# Patient Record
Sex: FEMALE | Race: WHITE
[De-identification: names, ages, dates, MRNs, and addresses within clinical notes are randomized per-mention and may not be internally consistent; named-entity substitution may affect disease eponyms.]

---

## 2019-10-13 ENCOUNTER — HOSPITAL ENCOUNTER (EMERGENCY)
Dept: HOSPITAL 59 - ER | Age: 29
LOS: 1 days | Discharge: HOME | End: 2019-10-14
Payer: COMMERCIAL

## 2019-10-13 DIAGNOSIS — R07.89: Primary | ICD-10-CM

## 2019-10-13 DIAGNOSIS — Z87.891: ICD-10-CM

## 2019-10-13 DIAGNOSIS — R11.0: ICD-10-CM

## 2019-10-13 LAB
ABSOLUTE NEUTROPHIL COUNT: 5.32
ALBUMIN SERPL-MCNC: 4.7 G/DL (ref 4–5)
ALBUMIN/GLOB SERPL: 2.1 {RATIO} (ref 1.1–1.8)
ALP SERPL-CCNC: 46 U/L (ref 35–104)
ALT SERPL-CCNC: 12 U/L (ref ?–33)
ANION GAP SERPL CALC-SCNC: 12 MMOL/L (ref 7–16)
AST SERPL-CCNC: 19 U/L (ref 10–35)
BASOPHILS NFR BLD: 0.1 % (ref 0–6)
BILIRUB SERPL-MCNC: 0.2 MG/DL (ref 0.2–1)
BUN SERPL-MCNC: 15 MG/DL (ref 6–20)
CK MB SERPL-MCNC: 1.6 NG/ML (ref ?–3.77)
CO2 SERPL-SCNC: 27 MMOL/L (ref 22–29)
CREAT SERPL-MCNC: 1 MG/DL (ref 0.5–0.9)
CREATINE PHOSPHOKINASE: 139 U/L (ref 26–192)
EOSINOPHIL NFR BLD: 1.3 % (ref 0–6)
ERYTHROCYTE [DISTWIDTH] IN BLOOD BY AUTOMATED COUNT: 12.9 % (ref 11.5–14.5)
EST GLOMERULAR FILTRATION RATE: > 60 ML/MIN
GLOBULIN SER-MCNC: 2.2 GM/DL (ref 1.4–4.8)
GLUCOSE SERPL-MCNC: 93 MG/DL (ref 74–109)
GRANULOCYTES NFR BLD: 65.1 % (ref 47–80)
HCT VFR BLD CALC: 42 % (ref 35–47)
HGB BLD-MCNC: 13.5 GM/DL (ref 11.6–16)
LYMPHOCYTES NFR BLD AUTO: 27.1 % (ref 16–45)
MCH RBC QN AUTO: 29.9 PG (ref 27–33)
MCHC RBC AUTO-ENTMCNC: 32.1 G/DL (ref 32–36)
MCV RBC AUTO: 92.9 FL (ref 81–97)
MONOCYTES NFR BLD: 6.4 % (ref 0–9)
PLATELET # BLD: 249 K/UL (ref 130–400)
PMV BLD AUTO: 10.6 FL (ref 7.4–10.4)
PROT SERPL-MCNC: 6.9 G/DL (ref 6.6–8.7)
RBC # BLD AUTO: 4.52 M/UL (ref 3.8–5.4)
WBC # BLD AUTO: 8.2 K/UL (ref 4.2–12.2)

## 2019-10-13 PROCEDURE — 82550 ASSAY OF CK (CPK): CPT

## 2019-10-13 PROCEDURE — 71046 X-RAY EXAM CHEST 2 VIEWS: CPT

## 2019-10-13 PROCEDURE — 93010 ELECTROCARDIOGRAM REPORT: CPT

## 2019-10-13 PROCEDURE — 85025 COMPLETE CBC W/AUTO DIFF WBC: CPT

## 2019-10-13 PROCEDURE — 93005 ELECTROCARDIOGRAM TRACING: CPT

## 2019-10-13 PROCEDURE — 99284 EMERGENCY DEPT VISIT MOD MDM: CPT

## 2019-10-13 PROCEDURE — 85379 FIBRIN DEGRADATION QUANT: CPT

## 2019-10-13 PROCEDURE — 84484 ASSAY OF TROPONIN QUANT: CPT

## 2019-10-13 PROCEDURE — 80053 COMPREHEN METABOLIC PANEL: CPT

## 2019-10-13 PROCEDURE — 82553 CREATINE MB FRACTION: CPT

## 2019-10-13 RX ADMIN — NITROGLYCERIN PRN MG: 0.4 TABLET SUBLINGUAL at 21:15

## 2019-10-13 RX ADMIN — NITROGLYCERIN PRN MG: 0.4 TABLET SUBLINGUAL at 20:57

## 2019-10-13 RX ADMIN — NITROGLYCERIN PRN MG: 0.4 TABLET SUBLINGUAL at 21:03

## 2019-10-14 NOTE — EMERGENCY DEPARTMENT RECORD
History of Present Illness





- General


Chief Complaint: Chest Pain


Stated Complaint: CHEST PAIN PRESSURE


Time Seen by Provider: 10/13/19 20:13


Source: Patient


Mode of Arrival: Ambulatory


Limitations: No limitations





- History of Present Illness


Initial Comments: 





pt has had cp all day that goes across her chest.  nothing makes better or 

worse.  she has no accompanying symptoms.  she has not had this before


MD Complaint: Chest pain


Onset/Timin


-: Hour(s)


Onset: Other


Pain Location: Substernal, Left chest


Pain Radiation: LUE


Severity: Moderate


Severity scale (1-10): 5


Quality: Aching, Dull


Consistency: Constant


Improves With: Nothing


Worsens With: Nothing


Anginal Symptoms: Nausea


Treatments Prior to Arrival: None





- Related Data


On Oral Contraceptives: No


                                    Allergies











Allergy/AdvReac Type Severity Reaction Status Date / Time


 


No Known Drug Allergies Allergy   Unverified 19 08:24














Travel Screening





- Travel/Exposure Within Last 30 Days


Have you traveled within the last 30 days?: No





- Travel/Exposure Within Last Year


Have you traveled outside the [place] in the last year?: No





- Additonal Travel Details


Have you been exposed to anyone with a communicable illness?: No





- Travel Symptoms


Symptom Screening: None





Review of Systems


Reviewed: No additional complaints except as noted below


Constitutional: Reports: As per HPI.  Denies: Chills, Fever, Malaise, Night 

sweats, Weakness, Weight change


Eyes: Reports: As per HPI.  Denies: Eye discharge, Eye pain, Photophobia, Vision

change


ENT: Reports: As per HPI.  Denies: Congestion, Dental pain, Ear pain, Epistaxis,

Hearing loss, Throat pain


Respiratory: Reports: As per HPI.  Denies: Cough, Dyspnea, Hemoptysis, Stridor, 

Wheezes


Cardiovascular: Reports: As per HPI, Chest pain.  Denies: Arrhythmia, Dyspnea on

exertion, Edema, Murmurs, Orthopnea, Palpitations, Paroxysmal nocturnal dyspnea,

Rheumatic Fever, Syncope


Endocrine: Reports: As per HPI.  Denies: Fatigue, Heat or cold intolerance, 

Polydipsia, Polyuria


Gastrointestinal: Reports: As per HPI.  Denies: Abdominal pain, Constipation, 

Diarrhea, Hematemesis, Hematochezia, Melena, Nausea, Vomiting


Genitourinary: Reports: As per HPI.  Denies: Abnormal menses, Discharge, 

Dyspareunia, Dysuria, Frequency, Hematuria, Incontinence, Retention, Urgency


Musculoskeletal: Reports: As per HPI.  Denies: Arthralgia, Back pain, Gout, 

Joint swelling, Myalgia, Neck pain


Skin: Reports: As per HPI.  Denies: Bruising, Change in color, Change in 

hair/nails, Lesions, Pruritus, Rash


Neurological: Reports: As per HPI.  Denies: Abnormal gait, Confusion, Headache, 

Numbness, Paresthesias, Seizure, Tingling, Tremors, Vertigo, Weakness


Psychiatric: Reports: As per HPI.  Denies: Anxiety, Auditory hallucinations, Dep

ression, Homicidal thoughts, Suicidal thoughts, Visual hallucinations


Hematological/Lymphatic: Reports: As per HPI.  Denies: Anemia, Blood Clots, Easy

bleeding, Easy bruising, Swollen glands





Past Medical History





- SOCIAL HISTORY


Smoking Status: Former smoker


Alcohol Use: Rare


Drug Use: None





- RESPIRATORY


Hx Respiratory Disorders: No


Hx Asthma: Yes (as a kid)





- CARDIOVASCULAR


Hx Cardio Disorders: No


Comment:: preeclampsia





- NEURO


Hx Neuro Disorders: No





- GI


Hx GI Disorders: No





- 


Hx Genitourinary Disorders: Yes





- ENDOCRINE


Hx Endocrine Disorders: No





- MUSCULOSKELETAL


Hx Musculoskeletal Disorders: No





- PSYCH


Hx Psych Problems: Yes


Hx Depression: Yes





- HEMATOLOGY/ONCOLOGY


Hx Hematology/Oncology Disorders: No





Family Medical History


Any Significant Family History?: No





Physical Exam





- General


General Appearance: Alert, Oriented x3, Cooperative, Mild distress





- Head


Head exam: Normal inspection





- Eye


Eye exam: Normal appearance, PERRL, EOMI


Pupils: Normal accommodation





- ENT


ENT exam: Normal exam, Mucous membranes moist, Normal external ear exam, Normal 

orophraynx


Ear exam: Normal external inspection.  negative: External canal tenderness


Nasal Exam: Normal inspection.  negative: Discharge, Sinus tenderness


Mouth exam: Normal external inspection, Tongue normal


Teeth exam: Normal inspection.  negative: Dental caries


Throat exam: Normal inspection.  negative: Tonsillar erythema, Tonsillar exudate





- Neck


Neck exam: Normal inspection, Full ROM.  negative: Tenderness





- Respiratory


Respiratory exam: Normal lung sounds bilaterally.  negative: Respiratory 

distress





- Cardiovascular


Cardiovascular Exam: Regular rate, Normal rhythm, Normal heart sounds





- GI/Abdominal


GI/Abdominal exam: Soft, Normal bowel sounds.  negative: Tenderness





- Rectal


Rectal exam: Deferred





- 


 exam: Deferred





- Extremities


Extremities exam: Normal inspection, Full ROM, Normal capillary refill.  

negative: Tenderness





- Back


Back exam: Reports: Normal inspection, Full ROM.  Denies: Muscle spasm, Rash 

noted, Tenderness





- Neurological


Neurological exam: Alert, CN II-XII intact, Normal gait, Oriented X3, Reflexes 

normal





- Psychiatric


Psychiatric exam: Normal affect, Normal mood





- Skin


Skin exam: Dry, Intact, Normal color, Warm





Course





                                   Vital Signs











  10/13/19 10/13/19 10/13/19





  20:00 20:58 21:04


 


Temperature 98.4 F  


 


Pulse Rate [ 76 68 70





Pulse Ox Probe]   


 


Respiratory 20 18 18





Rate   


 


Blood Pressure 128/76 112/71 109/79





[Left Arm]   


 


Pulse Ox 100 110 H 100














  10/13/19 10/13/19





  21:14 22:10


 


Temperature  


 


Pulse Rate [ 72 73





Pulse Ox Probe]  


 


Respiratory 18 16





Rate  


 


Blood Pressure 110/71 115/73





[Left Arm]  


 


Pulse Ox 100 100














- Reevaluation(s)


Reevaluation #1: 





10/14/19 00:48


pts pain got better with ntg. pt kept for 4 hour troponin which is also neg.  

ekg nsr.





Medical Decision Making





- Lab Data


Result diagrams: 


                                 10/13/19 20:08





                                 10/13/19 20:08





                                   Lab Results











  10/13/19 10/13/19 10/13/19 Range/Units





  20:08 20:08 20:08 


 


WBC  8.2    (4.2-12.2)  K/uL


 


RBC  4.52    (3.80-5.40)  M/uL


 


Hgb  13.5    (11.6-16.0)  gm/dl


 


Hct  42.0    (35.0-47.0)  %


 


MCV  92.9    (81-97)  fl


 


MCH  29.9    (27-33)  pg


 


MCHC  32.1    (32-36)  g/dl


 


RDW  12.9    (11.5-14.5)  %


 


Plt Count  249    (130-400)  K/uL


 


MPV  10.6 H    (7.4-10.4)  fl


 


Gran %  65.1    (47-80)  %


 


Lymphocytes %  27.1    (16-45)  %


 


Monocytes %  6.4    (0-9)  %


 


Eosinophils %  1.3    (0-6)  %


 


Basophils %  0.1    (0-6)  %


 


Absolute Neutrophils  5.32    


 


D-Dimer    < 0.19  (0-0.59)  mg/L FEU


 


Sodium   139   (136-145)  mmol/L


 


Potassium   3.6   (3.4-4.5)  mmol/L


 


Chloride   100   ()  mmol/L


 


Carbon Dioxide   27.0   (22-29)  mmol/L


 


Anion Gap   12.0   (7-16)  


 


BUN   15   (6-20)  mg/dL


 


Creatinine   1.0 H   (0.5-0.9)  mg/dL


 


Estimated GFR   > 60   mL/min


 


Random Glucose   93   ()  mg/dL


 


Calcium   9.6   (8.6-10.0)  mg/dL


 


Total Bilirubin   0.20   (0.2-1.0)  mg/dL


 


AST   19   (10.0-35.0)  U/L


 


ALT   12   (<33)  U/L


 


Alkaline Phosphatase   46   ()  U/L


 


Creatine Kinase     ()  U/L


 


CK-MB (CK-2)     (<3.77)  ng/mL


 


Troponin T   < 0.010   (0-0.010)  ng/mL


 


Total Protein   6.9   (6.6-8.7)  g/dL


 


Albumin   4.7   (4.0-5.0)  g/dL


 


Globulin   2.2   (1.4-4.8)  gm/dL


 


Albumin/Globulin Ratio   2.1 H   (1.1-1.8)  














  10/13/19 10/14/19 Range/Units





  20:08 00:11 


 


WBC    (4.2-12.2)  K/uL


 


RBC    (3.80-5.40)  M/uL


 


Hgb    (11.6-16.0)  gm/dl


 


Hct    (35.0-47.0)  %


 


MCV    (81-97)  fl


 


MCH    (27-33)  pg


 


MCHC    (32-36)  g/dl


 


RDW    (11.5-14.5)  %


 


Plt Count    (130-400)  K/uL


 


MPV    (7.4-10.4)  fl


 


Gran %    (47-80)  %


 


Lymphocytes %    (16-45)  %


 


Monocytes %    (0-9)  %


 


Eosinophils %    (0-6)  %


 


Basophils %    (0-6)  %


 


Absolute Neutrophils    


 


D-Dimer    (0-0.59)  mg/L FEU


 


Sodium    (136-145)  mmol/L


 


Potassium    (3.4-4.5)  mmol/L


 


Chloride    ()  mmol/L


 


Carbon Dioxide    (22-29)  mmol/L


 


Anion Gap    (7-16)  


 


BUN    (6-20)  mg/dL


 


Creatinine    (0.5-0.9)  mg/dL


 


Estimated GFR    mL/min


 


Random Glucose    ()  mg/dL


 


Calcium    (8.6-10.0)  mg/dL


 


Total Bilirubin    (0.2-1.0)  mg/dL


 


AST    (10.0-35.0)  U/L


 


ALT    (<33)  U/L


 


Alkaline Phosphatase    ()  U/L


 


Creatine Kinase  139   ()  U/L


 


CK-MB (CK-2)  1.6   (<3.77)  ng/mL


 


Troponin T   < 0.010  (0-0.010)  ng/mL


 


Total Protein    (6.6-8.7)  g/dL


 


Albumin    (4.0-5.0)  g/dL


 


Globulin    (1.4-4.8)  gm/dL


 


Albumin/Globulin Ratio    (1.1-1.8)  














Disposition


Disposition: Discharge


Clinical Impression: 


Chest pain


Qualifiers:


 Chest pain type: other chest pain Qualified Code(s): R07.89 - Other chest pain;

R07.8 - Other chest pain





Disposition: Home, Self-Care


Condition: (1) Good


Instructions:  Chest Pain (ED)


Additional Instructions: 


follow up with family doctor for further evaluation.  return sooner if worse.  

take a baby aspirin a day.





Quality





- Quality Measures


Quality Measures: N/A





- Blood Pressure Screening


Does Patient Have Any of the Following: No


Blood Pressure Classification: Normal BP Reading


Systolic Measurement: 115


Diastolic Measurement: 73


Screening for High Blood Pressure: < Normal BP, F/U Not Required > []

## 2019-10-14 NOTE — RADIOLOGY REPORT
STUDY: Chest 2 views. 



CLINICAL HISTORY: Chest pain beginning tonight. 



TECHNIQUE: Upright PA and lateral views of the chest. 



COMPARISON: Two-view chest radiographic examination dated 08/29/2018. 



FINDINGS: The cardiomediastinal silhouette remains normal in size and 
configuration. The pulmonary vasculature is nondilated. The lungs and pleural 
spaces are clear. No acute osseous abnormality. 



IMPRESSION: No radiographic evidence of acute cardiopulmonary disease. 

MTDD